# Patient Record
Sex: MALE | Race: BLACK OR AFRICAN AMERICAN | NOT HISPANIC OR LATINO | Employment: UNEMPLOYED | URBAN - METROPOLITAN AREA
[De-identification: names, ages, dates, MRNs, and addresses within clinical notes are randomized per-mention and may not be internally consistent; named-entity substitution may affect disease eponyms.]

---

## 2023-01-22 ENCOUNTER — HOSPITAL ENCOUNTER (EMERGENCY)
Facility: MEDICAL CENTER | Age: 48
End: 2023-01-22
Attending: EMERGENCY MEDICINE
Payer: COMMERCIAL

## 2023-01-22 VITALS
OXYGEN SATURATION: 98 % | RESPIRATION RATE: 15 BRPM | TEMPERATURE: 97.1 F | HEART RATE: 85 BPM | DIASTOLIC BLOOD PRESSURE: 76 MMHG | SYSTOLIC BLOOD PRESSURE: 136 MMHG | HEIGHT: 78 IN | BODY MASS INDEX: 36.45 KG/M2 | WEIGHT: 315 LBS

## 2023-01-22 DIAGNOSIS — Z86.39 HISTORY OF DIABETES MELLITUS: ICD-10-CM

## 2023-01-22 DIAGNOSIS — I82.5Y9 CHRONIC DEEP VEIN THROMBOSIS (DVT) OF PROXIMAL VEIN OF LOWER EXTREMITY, UNSPECIFIED LATERALITY (HCC): ICD-10-CM

## 2023-01-22 DIAGNOSIS — Z59.00 HOMELESS: ICD-10-CM

## 2023-01-22 DIAGNOSIS — Z76.0 MEDICATION REFILL: ICD-10-CM

## 2023-01-22 PROCEDURE — 99283 EMERGENCY DEPT VISIT LOW MDM: CPT

## 2023-01-22 RX ORDER — WARFARIN SODIUM 5 MG/1
5 TABLET ORAL DAILY
Qty: 30 TABLET | Refills: 3 | Status: ACTIVE | OUTPATIENT
Start: 2023-01-22

## 2023-01-22 RX ORDER — WARFARIN SODIUM 5 MG/1
5 TABLET ORAL ONCE
Status: DISCONTINUED | OUTPATIENT
Start: 2023-01-22 | End: 2023-01-22 | Stop reason: HOSPADM

## 2023-01-22 SDOH — ECONOMIC STABILITY - HOUSING INSECURITY: HOMELESSNESS UNSPECIFIED: Z59.00

## 2023-01-23 NOTE — ED NOTES
Pt provided with discharge instructions and resources provided by [facility] . Pt verbalizes understanding. Pt assisted out of ed with steady gait.

## 2023-01-23 NOTE — ED PROVIDER NOTES
"ED Provider Note    CHIEF COMPLAINT  Chief Complaint   Patient presents with    Medication Refill     Pt reports that he needs warfarin, glucophage and INR       HPI/ROS    Dwight Bear is a 47 y.o. male who presents for medication refill of his metformin, Coumadin as well as Phenergan with codeine and Dilaudid.  Patient reports he is from [place] and just arrived here, he states he is homeless and needs to see social work as well as needs a primary care physician.  States he has not had his medication in several weeks.  She of DVT which is why he takes Coumadin.  Patient offers no other complaints, he is very sleepy, sleeps through most of the examination and requires frequent arousals to wake him up.    PAST MEDICAL HISTORY   has a past medical history of DM (diabetes mellitus) (HCC) and DVT (deep venous thrombosis) (HCC).    SURGICAL HISTORY  patient denies any surgical history    FAMILY HISTORY  History reviewed. No pertinent family history.    SOCIAL HISTORY  Social History     Tobacco Use    Smoking status: Unknown    Smokeless tobacco: Not on file   Substance and Sexual Activity    Alcohol use: Not Currently    Drug use: Not Currently    Sexual activity: Not on file       CURRENT MEDICATIONS  Home Medications       Reviewed by Constance Mcguire R.N. (Registered Nurse) on 01/22/23 at 1641  Med List Status: Partial     Medication Last Dose Status        Patient Italo Taking any Medications                           ALLERGIES  No Known Allergies    PHYSICAL EXAM  VITAL SIGNS: /76   Pulse 85   Temp 36.2 °C (97.1 °F) (Temporal)   Resp 15   Ht 1.981 m (6' 6\")   Wt (!) 148 kg (326 lb 4.5 oz)   SpO2 98%   BMI 37.71 kg/m²    Constitutional: Alert in no apparent distress.  Very sleepy but easily arouses.  HENT: No signs of significant acute trauma.   Eyes: Conjunctiva normal, non-icteric.   Chest: Normal nonlabored respirations  Skin: No appreciable rash on the exposed skin  Musculoskeletal: No " obvious acute trauma appreciated  Neurologic: Alert, no obvious focal deficits noted.      COURSE & MEDICAL DECISION MAKING      INITIAL ASSESSMENT AND PLAN  Care Narrative: 47-year-old homeless male who request medication refill of many medications, some of which are controlled pain medications and these will not be refilled he will the emergency department.  I will refill his Coumadin and Glucophage and refer him to the coagulation clinic.  I will also refer to primary care.  We will have a  with him at his request.  However the patient tells me he is new to the area but looking at the chart he has been bouncing back and forth from [facility] [place] here in [place] and [facility].  At this point, he will be discharged to follow-up the primary care provider that I referred him to    ADDITIONAL PROBLEM LIST AND DISPOSITION    Problem #1: Medication refill for diabetic medications  Problem #2: Medication refill for his anticoagulation for his DVT, Coumadin dose given here in the ER  Problem #3: Homeless, social work provided resources  Problem #4: Needs primary care, referral placed      FINAL DIAGNOSIS  1. Medication refill    2. Chronic deep vein thrombosis (DVT) of proximal vein of lower extremity, unspecified laterality (HCC)    3. History of diabetes mellitus    4. Homeless               Electronically signed by: Brennan Reed M.D., 1/22/2023 5:52 PM

## 2023-01-23 NOTE — ED TRIAGE NOTES
"Chief Complaint   Patient presents with    Medication Refill     Pt reports that he needs warfarin, glucophage and INR     BP (!) 143/90   Pulse 96   Temp 35.9 °C (96.6 °F) (Temporal)   Resp 18   Ht 1.981 m (6' 6\")   Wt (!) 148 kg (326 lb 4.5 oz)   SpO2 100%   Pt informed of wait times. Educated on triage process.  Asked to return to triage RN for any new or worsening of symptoms. Thanked for patience.      "

## 2023-01-23 NOTE — DISCHARGE PLANNING
SW attempted to met with the patient; however, was unable to do so because the patient was sleeping. SW gave the nurse [facility] and [place] [facility] phone numbers to give to the patient.

## 2023-01-24 ENCOUNTER — DOCUMENTATION (OUTPATIENT)
Dept: VASCULAR LAB | Facility: MEDICAL CENTER | Age: 48
End: 2023-01-24
Payer: COMMERCIAL

## 2023-01-24 NOTE — PROGRESS NOTES
[facility] Heart and Vascular Health and Pharmacotherapy Programs    Received anticoagulation referral due to hx of VTE from ED discharge team on 1-22-23.    LM with patient to c/b to establish care.    Insurance: [employer]   PCP: None  Locations to be seen: [facility] Anticoagulation/Pharmacotherapy Clinic at [phone], fax [phone]    Cisco Ramirez, PharmD, BCACP

## 2023-01-26 ENCOUNTER — TELEPHONE (OUTPATIENT)
Dept: VASCULAR LAB | Facility: MEDICAL CENTER | Age: 48
End: 2023-01-26
Payer: COMMERCIAL

## 2023-01-26 NOTE — TELEPHONE ENCOUNTER
[facility] Heart and Vascular Health and Pharmacotherapy Programs     Received anticoagulation referral due to hx of VTE from ED discharge team on 1-22-23.     2nd call  LM with patient to c/b to establish care.     Insurance: [employer]   PCP: None  Locations to be seen: [facility] Anticoagulation/Pharmacotherapy Clinic at [phone], fax [phone]     Jennifer Mercado, PharmD

## 2023-01-30 ENCOUNTER — TELEPHONE (OUTPATIENT)
Dept: VASCULAR LAB | Facility: MEDICAL CENTER | Age: 48
End: 2023-01-30
Payer: COMMERCIAL

## 2023-01-30 NOTE — TELEPHONE ENCOUNTER
[facility] Heart and Vascular Health and Pharmacotherapy Programs     Received anticoagulation referral due to hx of VTE from ED discharge team on 1-22-23.     3rd call  LM with patient to c/b to establish care.  Sent letter     Insurance: [employer]   PCP: None  Locations to be seen: [facility] Anticoagulation/Pharmacotherapy Clinic at [phone], fax [phone]     Jennifer Mercado, PharmD

## 2023-01-30 NOTE — LETTER
January 30, 2023        Dwight Bear   [address]  [place] [address]        Dear Dwigth Bear ,    We have been unsuccessful in our attempts to contact you regarding your Anticoagulation Service referral.  Anticoagulants are medications that can cause potential harmful side effects when not monitored properly. Without proper monitoring there is potential for serious, sometimes life-threatening bleeding problems or life-threatening blood clots or stroke could result.    Please contact our clinic so we may assist you.  We are open Monday-Friday 8 am until 5 pm.  You may reach our Service at [phone].    To monitor your anticoagulant effectively, we need to be able to communicate with you.  This is a requirement to be followed by our Service.  Please contact the clinic as soon as possible to establish care and provide your current contact information.  Thank you.        Sincerely,        Joni Gonzalez PharmD, [facility]  Pharmacy Clinical Supervisor  [facility]  Outpatient Anticoagulation Service

## 2023-02-06 ENCOUNTER — TELEPHONE (OUTPATIENT)
Dept: VASCULAR LAB | Facility: MEDICAL CENTER | Age: 48
End: 2023-02-06
Payer: COMMERCIAL

## 2023-02-06 NOTE — TELEPHONE ENCOUNTER
[facility] Heart and Vascular Health and Pharmacotherapy Programs     Received anticoagulation referral for warfarin due to hx of VTE from ED discharge team on 1-22-23.     4th call  LM with patient to c/b to establish care.     Insurance: [employer]   PCP: None  Locations to be seen: [facility] Anticoagulation/Pharmacotherapy Clinic at [phone], fax [phone]     Jennifer Mercado, PharmD

## 2023-02-13 ENCOUNTER — TELEPHONE (OUTPATIENT)
Dept: VASCULAR LAB | Facility: MEDICAL CENTER | Age: 48
End: 2023-02-13
Payer: COMMERCIAL

## 2023-02-13 NOTE — TELEPHONE ENCOUNTER
[facility] Heart and Vascular Health and Pharmacotherapy Programs     Received anticoagulation referral for warfarin due to hx of VTE from ED discharge team on 1-22-23.     5th call  LM with patient to c/b to establish care.     Insurance: [employer]   PCP: None  Locations to be seen: [facility] Anticoagulation/Pharmacotherapy Clinic at [phone], fax [phone]     Jennifer Mercado, PharmD

## 2023-02-22 ENCOUNTER — TELEPHONE (OUTPATIENT)
Dept: VASCULAR LAB | Facility: MEDICAL CENTER | Age: 48
End: 2023-02-22
Payer: COMMERCIAL

## 2023-02-22 NOTE — TELEPHONE ENCOUNTER
[facility] Heart and Vascular Health and Pharmacotherapy Programs     Received anticoagulation referral for warfarin due to hx of VTE from ED discharge team on 1-22-23.     6th call    LM with patient to c/b to establish care.    Sent letter x 2.    Will await pt contact.     Insurance: [employer]   PCP: None  Locations to be seen: [facility] Anticoagulation/Pharmacotherapy Clinic at [phone], fax [phone]    Kenney Morales, Jose, BCACP    CC: Dr. Bloch